# Patient Record
Sex: MALE
[De-identification: names, ages, dates, MRNs, and addresses within clinical notes are randomized per-mention and may not be internally consistent; named-entity substitution may affect disease eponyms.]

---

## 2024-01-31 PROBLEM — Z00.00 ENCOUNTER FOR PREVENTIVE HEALTH EXAMINATION: Status: ACTIVE | Noted: 2024-01-31

## 2024-02-01 ENCOUNTER — APPOINTMENT (OUTPATIENT)
Dept: RADIOLOGY | Facility: CLINIC | Age: 39
End: 2024-02-01
Payer: COMMERCIAL

## 2024-02-01 ENCOUNTER — APPOINTMENT (OUTPATIENT)
Dept: RHEUMATOLOGY | Facility: CLINIC | Age: 39
End: 2024-02-01
Payer: COMMERCIAL

## 2024-02-01 ENCOUNTER — NON-APPOINTMENT (OUTPATIENT)
Age: 39
End: 2024-02-01

## 2024-02-01 ENCOUNTER — OUTPATIENT (OUTPATIENT)
Dept: OUTPATIENT SERVICES | Facility: HOSPITAL | Age: 39
LOS: 1 days | End: 2024-02-01

## 2024-02-01 VITALS
HEIGHT: 67 IN | BODY MASS INDEX: 26.84 KG/M2 | SYSTOLIC BLOOD PRESSURE: 145 MMHG | HEART RATE: 102 BPM | WEIGHT: 171 LBS | OXYGEN SATURATION: 98 % | TEMPERATURE: 96.7 F | DIASTOLIC BLOOD PRESSURE: 97 MMHG

## 2024-02-01 PROCEDURE — 73070 X-RAY EXAM OF ELBOW: CPT | Mod: 26,LT

## 2024-02-01 PROCEDURE — 99203 OFFICE O/P NEW LOW 30 MIN: CPT | Mod: 25

## 2024-02-01 PROCEDURE — 36415 COLL VENOUS BLD VENIPUNCTURE: CPT

## 2024-02-01 NOTE — HISTORY OF PRESENT ILLNESS
[FreeTextEntry1] : 39 yo M w h/o asthma, anxiety referred for gout management. PCP is [facility]. Presented in 2018 with polyarticular gout affecting knees, ankles, L elbow. Episodes would come acutely, intense pain for 3-5 days and than slowly improve over the course of 2-3 weeks. Flares continued 2-3x yr. Took him a while to receive the dx. in 2019 went to Ortho at [place] in the midst of L midfoot swelling and was dx w gout. Says his UA was 7 (maybe above).  Was started on Allopurinol 100mg QD + Colchicine. With this he had decreased flare frequency but it did not abrogated completely. HE is currently on 200mg Allopurinol since past summer. Last flare 4-5 mo ago of the R foot. Took Colchicine and 750 mg Naproxen. It helped.  Overall he decreased his activities. 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 He travels a lot for work, had [place] flares. Impeding on his job performance.  Right now feels at baseline, no achy joints. No morning stiffness. Pain 0/10.  PMH: Asthma  Meds:Taking Bupropion and allopurinol. Surgical none SH Lives at home, working, no toxic habits.  NKDA

## 2024-02-01 NOTE — PHYSICAL EXAM
[TextEntry] :   PHYSICAL EXAM: Vital signs reviewed, normal . Pain 0/10 GEN: AAOx3, NAD - well appearing EYES: PERRL, EOMI. EAR, NOSE AND THROAT:  Oropharynx pink w/o ulcers or exudates. Optimal dentition. HEMATOLOGIC/LYMPHATIC: No LAD. PULMONARY: Good air movement, CTA, no wheezes or rales. CARDIO-VASCULAR: NS1S2, RRR, no mrg. GASTRO-INTESTINAL: Normoactive BS, soft NT/ND, no organomegaly. MUSCULOSKELETAL: DIPs- Full ROM, no synovitis. PIPs- Full ROM, no synovitis. MCPs- Full ROM, no synovitis. WRISTS- Full ROM, no synovitis. ELBOWS- Full ROM, no synovitis. L: enlarged olecranon bursa, firmer material present SHOULDERS- Full ROM b/l. No localized tenderness in subacromial bursa or biceps tendon. HIPS- Full ROM including internal and external rotation; No localized tenderness over greater trochanters. KNEES- No effusion, normal ROM, no local tenderness in the region of anserine bursa or joint line. ANKLES- Full ROM, no synovitis. FEET- No  MTP squeeze tenderness. Warm extremities, 2+ radial and pedal pulses, no peripheral edema. NEUROLOGIC: Fluent speech, normal gait. Muscle strength 5/5 in all extremities. SKIN: Clear, no rashes.

## 2024-02-01 NOTE — REVIEW OF SYSTEMS
[TextEntry] :  REVIEW OF THE SYSTEMS CONSTITUTIONAL: No fever, chills, night sweats, weight loss, fatigue. SKIN: No rash, psoriatic plaques, photosensitivity, Raynaud's phenomenon, subcutaneous nodules, tophi, alopecia, skin thickening. EYES: No dry eyes, history of inflammatory eye disease. No blurry vision or diplopia. ENMT: No dry mouth, oral ulcers, sinus problems, epistaxis. No jaw or scalp tenderness. MUSCULOSKELETAL: As per HPI NEUROLOGIC: No HA, paresthesia, generalized or focal weakness. CARDIO-VASCULAR: No CP, orthopnea, palpitations. PULMONARY: No SOB, cough, hemoptysis, wheezing. GASTROINTESTINAL: No recent/current diarrhea, nausea/vomiting, abdominal pain, bleed, dysphagia or GERD. GENITOURINARY: No hematuria or dysuria. HEMATOLOGIC/LYMPHATIC: No lymphadenopathy.

## 2024-02-01 NOTE — ASSESSMENT
[FreeTextEntry1] : ASSESSMENT and PLAN:    37 yo M w h/o asthma, anxiety referred for gout management.   # Polyarticular gout ? Tophaceous gout Presenting already on Allopurinol 200 mg QD. ? 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  Not crystal proven disease. I doubt this is undiagnosed RA or SpA. Plan: -  I will check labs necessary to assure no side effects with longterm medication use and disease progression.. Check CBC w diff, CMP, UA, Mg, Phos and RF anti CCP .  - Increase Allopurinol 300mg QD Goal UA below 6 or 5 if tophaceous disease.  - HAs Colchicine ppx at home.  - Follow up in 4w   Sarahi Starr MD Rheumatology Attending

## 2024-02-02 ENCOUNTER — TRANSCRIPTION ENCOUNTER (OUTPATIENT)
Age: 39
End: 2024-02-02

## 2024-02-09 LAB
ALBUMIN SERPL ELPH-MCNC: 4.6 G/DL
ALP BLD-CCNC: 86 U/L
ALT SERPL-CCNC: 34 U/L
ANION GAP SERPL CALC-SCNC: 16 MMOL/L
AST SERPL-CCNC: 43 U/L
BASOPHILS # BLD AUTO: 0.12 K/UL
BASOPHILS NFR BLD AUTO: 1.6 %
BILIRUB SERPL-MCNC: 0.3 MG/DL
BUN SERPL-MCNC: 13 MG/DL
CALCIUM SERPL-MCNC: 9.3 MG/DL
CCP AB SER IA-ACNC: <8 UNITS
CHLORIDE SERPL-SCNC: 94 MMOL/L
CO2 SERPL-SCNC: 21 MMOL/L
CREAT SERPL-MCNC: 1.07 MG/DL
EGFR: 91 ML/MIN/1.73M2
EOSINOPHIL # BLD AUTO: 0.17 K/UL
EOSINOPHIL NFR BLD AUTO: 2.3 %
GLUCOSE SERPL-MCNC: 83 MG/DL
HCT VFR BLD CALC: 42 %
HGB BLD-MCNC: 14 G/DL
IMM GRANULOCYTES NFR BLD AUTO: 1 %
LYMPHOCYTES # BLD AUTO: 2.18 K/UL
LYMPHOCYTES NFR BLD AUTO: 29.7 %
MAGNESIUM SERPL-MCNC: 2 MG/DL
MAN DIFF?: NORMAL
MCHC RBC-ENTMCNC: 31.9 PG
MCHC RBC-ENTMCNC: 33.3 GM/DL
MCV RBC AUTO: 95.7 FL
MONOCYTES # BLD AUTO: 0.6 K/UL
MONOCYTES NFR BLD AUTO: 8.2 %
NEUTROPHILS # BLD AUTO: 4.21 K/UL
NEUTROPHILS NFR BLD AUTO: 57.2 %
PHOSPHATE SERPL-MCNC: 4.2 MG/DL
PLATELET # BLD AUTO: 345 K/UL
POTASSIUM SERPL-SCNC: 4.4 MMOL/L
PROT SERPL-MCNC: 7.1 G/DL
RBC # BLD: 4.39 M/UL
RBC # FLD: 12.7 %
RF+CCP IGG SER-IMP: NEGATIVE
RHEUMATOID FACT SER QL: 13 IU/ML
SODIUM SERPL-SCNC: 130 MMOL/L
URATE SERPL-MCNC: 6.6 MG/DL
WBC # FLD AUTO: 7.35 K/UL

## 2024-03-01 ENCOUNTER — APPOINTMENT (OUTPATIENT)
Dept: RHEUMATOLOGY | Facility: CLINIC | Age: 39
End: 2024-03-01
Payer: COMMERCIAL

## 2024-03-01 VITALS
WEIGHT: 170 LBS | OXYGEN SATURATION: 99 % | TEMPERATURE: 97.9 F | DIASTOLIC BLOOD PRESSURE: 97 MMHG | HEIGHT: 67 IN | BODY MASS INDEX: 26.68 KG/M2 | SYSTOLIC BLOOD PRESSURE: 143 MMHG | HEART RATE: 95 BPM

## 2024-03-01 DIAGNOSIS — E87.1 HYPO-OSMOLALITY AND HYPONATREMIA: ICD-10-CM

## 2024-03-01 PROCEDURE — 36415 COLL VENOUS BLD VENIPUNCTURE: CPT

## 2024-03-01 PROCEDURE — 99214 OFFICE O/P EST MOD 30 MIN: CPT | Mod: 25

## 2024-03-07 LAB
ALBUMIN SERPL ELPH-MCNC: 4.8 G/DL
ALP BLD-CCNC: 73 U/L
ALT SERPL-CCNC: 27 U/L
ANION GAP SERPL CALC-SCNC: 15 MMOL/L
AST SERPL-CCNC: 28 U/L
BILIRUB SERPL-MCNC: 0.6 MG/DL
BUN SERPL-MCNC: 8 MG/DL
CALCIUM SERPL-MCNC: 9.8 MG/DL
CHLORIDE SERPL-SCNC: 98 MMOL/L
CO2 SERPL-SCNC: 22 MMOL/L
CREAT SERPL-MCNC: 0.84 MG/DL
EGFR: 114 ML/MIN/1.73M2
GLUCOSE SERPL-MCNC: 87 MG/DL
OSMOLALITY SERPL: 279 MOSMOL/KG
POTASSIUM SERPL-SCNC: 4.5 MMOL/L
PROT SERPL-MCNC: 7.4 G/DL
SODIUM SERPL-SCNC: 136 MMOL/L
URATE SERPL-MCNC: 4.5 MG/DL

## 2024-06-03 ENCOUNTER — APPOINTMENT (OUTPATIENT)
Dept: RHEUMATOLOGY | Facility: CLINIC | Age: 39
End: 2024-06-03
Payer: COMMERCIAL

## 2024-06-03 DIAGNOSIS — M10.9 GOUT, UNSPECIFIED: ICD-10-CM

## 2024-06-03 PROCEDURE — 99213 OFFICE O/P EST LOW 20 MIN: CPT

## 2024-06-03 RX ORDER — ALLOPURINOL 300 MG/1
300 TABLET ORAL
Qty: 90 | Refills: 0 | Status: ACTIVE | COMMUNITY
Start: 2024-02-01 | End: 1900-01-01

## 2024-06-03 NOTE — ASSESSMENT
[FreeTextEntry1] : 39 yo M w h/o asthma, anxiety referred for gout management.  # Polyarticular gout ? Tophaceous gout # Hyponatremia Presenting already on Allopurinol 200 mg QD. ? 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 Not crystal proven disease. I doubt this is undiagnosed RA or SpA. UA 6.6- I increased Allopurinol 300mg QD and UA at 4.5 Today no issues reported. Plan: - C/w Allopurinol 300mg QD - Follow up in 3 mo  Sarahi Starr MD Rheumatology Attending.

## 2024-06-03 NOTE — HISTORY OF PRESENT ILLNESS
[FreeTextEntry1] : 37 yo M w h/o asthma, anxiety referred for gout management. PCP is [facility]. 2/1/2024 Presented in 2018 with polyarticular gout affecting knees, ankles, L elbow. Episodes would come acutely, intense pain for 3-5 days and than slowly improve over the course of 2-3 weeks. Flares continued 2-3x yr. Took him a while to receive the dx. in 2019 went to Ortho at [place] in the midst of L midfoot swelling and was dx w gout. Says his UA was 7 (maybe above). Was started on Allopurinol 100mg QD + Colchicine. With this he had decreased flare frequency but it did not abrogated completely. HE is currently on 200mg Allopurinol since past summer. Last flare 4-5 mo ago of the R foot. Took Colchicine and 750 mg Naproxen. It helped. Overall he decreased his activities. 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 He travels a lot for work, had [place] flares. Impeding on his job performance. Right now feels at baseline, no achy joints. No morning stiffness. Pain 0/10. During this visit his UA 6.6. . Increased Allopurinol 300mg QD and limit water intake. 3/1/2024 Pt is here and denies any new or worsening complaints. Denies joint pain or swelling. No overt morning stiffness. Takes all meds as scheduled and has no issues with meds. Extensive ROS obtained and documented below. UA at 4.5  6/3/2024  Denies joint pain or swelling. No overt morning stiffness. Takes all meds as scheduled and has no issues with meds. Extensive ROS obtained and documented below.

## 2024-06-03 NOTE — REASON FOR VISIT
[FreeTextEntry1] : gout, hyponatremia, elevated LFT [Home] : at home, [unfilled] , at the time of the visit. [Medical Office: ([facility])___] : at the medical office located in  [Verbal consent obtained from patient] : the patient, [unfilled] [Follow-Up: _____] : a [unfilled] follow-up visit